# Patient Record
Sex: MALE | Race: WHITE | NOT HISPANIC OR LATINO | Employment: FULL TIME | URBAN - METROPOLITAN AREA
[De-identification: names, ages, dates, MRNs, and addresses within clinical notes are randomized per-mention and may not be internally consistent; named-entity substitution may affect disease eponyms.]

---

## 2017-05-18 ENCOUNTER — OFFICE VISIT (OUTPATIENT)
Dept: OPHTHALMOLOGY | Facility: CLINIC | Age: 42
End: 2017-05-18
Attending: OPHTHALMOLOGY
Payer: COMMERCIAL

## 2017-05-18 DIAGNOSIS — H35.9 MYELINATED RETINAL NERVE FIBER LAYER: ICD-10-CM

## 2017-05-18 DIAGNOSIS — H30.042 ACUTE MACULAR NEURORETINITIS OF LEFT EYE: Primary | ICD-10-CM

## 2017-05-18 DIAGNOSIS — H52.13 MYOPIA WITH ASTIGMATISM, BILATERAL: ICD-10-CM

## 2017-05-18 DIAGNOSIS — H52.203 MYOPIA WITH ASTIGMATISM, BILATERAL: ICD-10-CM

## 2017-05-18 PROCEDURE — 92250 FUNDUS PHOTOGRAPHY W/I&R: CPT | Mod: ZF | Performed by: OPHTHALMOLOGY

## 2017-05-18 PROCEDURE — 99212 OFFICE O/P EST SF 10 MIN: CPT | Mod: 25,ZF

## 2017-05-18 PROCEDURE — 92015 DETERMINE REFRACTIVE STATE: CPT | Mod: ZF

## 2017-05-18 ASSESSMENT — REFRACTION_WEARINGRX
OD_AXIS: 032
OS_SPHERE: -1.25
OD_CYLINDER: +0.75
OS_CYLINDER: SPHERE
OD_SPHERE: -1.75

## 2017-05-18 ASSESSMENT — REFRACTION_MANIFEST
OS_AXIS: 015
OS_SPHERE: -1.75
OS_CYLINDER: +0.25
OD_AXIS: 032
OD_CYLINDER: +0.75
OD_SPHERE: -1.75

## 2017-05-18 ASSESSMENT — CUP TO DISC RATIO
OS_RATIO: 0.3
OD_RATIO: 0.3

## 2017-05-18 ASSESSMENT — CONF VISUAL FIELD
OS_NORMAL: 1
OD_NORMAL: 1

## 2017-05-18 ASSESSMENT — EXTERNAL EXAM - LEFT EYE: OS_EXAM: NORMAL

## 2017-05-18 ASSESSMENT — VISUAL ACUITY
METHOD: SNELLEN - LINEAR
OS_CC: 20/20
OD_CC: 20/20
OS_CC+: -1
CORRECTION_TYPE: GLASSES

## 2017-05-18 ASSESSMENT — SLIT LAMP EXAM - LIDS
COMMENTS: NORMAL
COMMENTS: NORMAL

## 2017-05-18 ASSESSMENT — TONOMETRY
OS_IOP_MMHG: 15
OD_IOP_MMHG: 17
IOP_METHOD: TONOPEN

## 2017-05-18 ASSESSMENT — EXTERNAL EXAM - RIGHT EYE: OD_EXAM: NORMAL

## 2017-05-18 NOTE — PROGRESS NOTES
I have confirmed the patient's and reviewed Past Medical History, Past Surgical History, Social History, Family History, Problem List, Medication List and agree with Tech note.    CC: follow up for [facility]A    HPI:  - vision seems the same or improved since last visit  - sees an optometrist every year    SUMMARY: Mr. Eid is a 41 year old male who was referred by Dr. Harmon for further evaluation for concerns in the patient's OCT for an abnormality inferotemporally. 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     IMAGING/TESTING:  - Optos fundus photos obtained last visit , shows myelinated retinal nerve fiber layer superior temporal in left eye   - 55degree FAF obtained, shows normal findings macula both eyes and disappearance of dark spot inferiorly in left eye, stable   - Octopus 24-2 visual field left eye showed before peripheral superior-nasal defect consistent which is now less dense and really changed into several non-specific defects    A/P:    Assessment & Plan     1. Unilateral Acute Idiopathic Maculopathy, left eye:   - appears stable and consistent with previous exam   - 55 degree fundus autofluorescence unchanged OU (previous inferotemporal lesion OS completely resolved)   - continue to monitor    2. Myelinated retina nerve fiber layer, left eye:   - Optos fundus photos both eyes for documentation previously obtained shows that this is getting smaller.    3. Myopic astigmatism, both eyes: stable     F/u: Return to Retina clinic in 1 yr for dilated eye exam and fundus autofluorescence ou.    Ant Lemos MD MPH   Ophthalmology Resident PGY-3    ATTESTATION:  I have seen and examined the patient with Dr. Lemos and agree with the findings in this note, as well as the interpretations of the diagnostic tests.    Alycia Ramírez MD PhD.  Professor & Chair

## 2017-05-18 NOTE — MR AVS SNAPSHOT
After Visit Summary   5/18/2017    Jose Eid    MRN: [id number]           Patient Information     Date Of Birth          1975        Visit Information        Provider Department      5/18/2017 7:30 AM [facility]cia [facility]        Today's Diagnoses     Acute macular neuroretinitis of left eye    -  1    Myopia with astigmatism, bilateral        Myelinated retinal nerve fiber layer - Left Eye           Follow-ups after your visit        Follow-up notes from your care team     Return in about 1 year (around 5/18/2018) for UAIM OS FAF OU.      Future tests that were ordered for you today     Open Future Orders        Priority Expected Expires Ordered    Fundus Autofluorescence Image (FAF) OU (both eyes) Routine  11/19/2018 5/18/2017            Who to contact     Please call your clinic at [phone] to:    Ask questions about your health    Make or cancel appointments    Discuss your medicines    Learn about your test results    Speak to your doctor   If you have compliments or concerns about an experience at your clinic, or if you wish to file a complaint, please contact [facility] Patient Relations at [phone] or email us at [email or web address]         Additional Information About Your Visit        MyChart Information     [employer] gives you secure access to your electronic health record. If you see a primary care provider, you can also send messages to your care team and make appointments. If you have questions, please call your primary care clinic.  If you do not have a primary care provider, please call [phone] and they will assist you.      [employer] is an electronic gateway that provides easy, online access to your medical records. With [employer], you can request a clinic appointment, read your test results, renew a prescription or communicate with your care team.     To access your existing account, please contact your  [facility] or call [phone] for assistance.        Care EveryWhere ID     This is your Care EveryWhere ID. This could be used by other organizations to access your [facility] medical records  [id number]         Blood Pressure from Last 3 Encounters:   08/11/14 112/76   08/01/14 120/78   01/20/14 130/83    Weight from Last 3 Encounters:   08/11/14 102.5 kg (226 lb)   08/01/14 102.5 kg (226 lb)   01/20/14 117.7 kg (259 lb 8 oz)              We Performed the Following     Fundus Autofluorescence Image (FAF) OU (both eyes)        Primary Care Provider Office Phone # Fax #    Severino Lyndon Fernandes [phone]       [facility] [address] [place] [address]        Thank you!     Thank you for choosing [facility]  for your care. Our goal is always to provide you with excellent care. Hearing back from our patients is one way we can continue to improve our services. Please take a few minutes to complete the written survey that you may receive in the mail after your visit with us. Thank you!             Your Updated Medication List - Protect others around you: Learn how to safely use, store and throw away your medicines at www.disposemymeds.org.          This list is accurate as of: 5/18/17  8:35 AM.  Always use your most recent med list.                   Brand Name Dispense Instructions for use    albuterol 108 (90 BASE) MCG/ACT Inhaler    albuterol    2 Inhaler    Inhale 1-2 puffs into the lungs every 4 hours as needed for shortness of breath / dyspnea

## 2018-04-18 ENCOUNTER — OFFICE VISIT (OUTPATIENT)
Dept: OPHTHALMOLOGY | Facility: CLINIC | Age: 43
End: 2018-04-18
Attending: OPHTHALMOLOGY
Payer: COMMERCIAL

## 2018-04-18 DIAGNOSIS — H52.13 MYOPIA WITH ASTIGMATISM, BILATERAL: Primary | ICD-10-CM

## 2018-04-18 DIAGNOSIS — H30.042 ACUTE MACULAR NEURORETINITIS OF LEFT EYE: ICD-10-CM

## 2018-04-18 DIAGNOSIS — H52.203 MYOPIA WITH ASTIGMATISM, BILATERAL: Primary | ICD-10-CM

## 2018-04-18 DIAGNOSIS — H35.9 MYELINATED RETINAL NERVE FIBER LAYER: ICD-10-CM

## 2018-04-18 PROCEDURE — G0463 HOSPITAL OUTPT CLINIC VISIT: HCPCS | Mod: ZF

## 2018-04-18 ASSESSMENT — REFRACTION_WEARINGRX
OS_AXIS: 015
OD_CYLINDER: +0.75
OD_SPHERE: -1.75
OS_SPHERE: -1.75
OS_CYLINDER: +0.25
OD_AXIS: 032

## 2018-04-18 ASSESSMENT — CUP TO DISC RATIO
OD_RATIO: 0.3
OS_RATIO: 0.3

## 2018-04-18 ASSESSMENT — SLIT LAMP EXAM - LIDS
COMMENTS: NORMAL
COMMENTS: NORMAL

## 2018-04-18 ASSESSMENT — VISUAL ACUITY
OD_CC: 20/20
CORRECTION_TYPE: GLASSES
METHOD: SNELLEN - LINEAR
OS_CC: 20/20

## 2018-04-18 ASSESSMENT — EXTERNAL EXAM - LEFT EYE: OS_EXAM: NORMAL

## 2018-04-18 ASSESSMENT — EXTERNAL EXAM - RIGHT EYE: OD_EXAM: NORMAL

## 2018-04-18 ASSESSMENT — TONOMETRY
OD_IOP_MMHG: 18
OS_IOP_MMHG: 16
IOP_METHOD: TONOPEN

## 2018-04-18 ASSESSMENT — CONF VISUAL FIELD
OD_NORMAL: 1
OS_NORMAL: 1

## 2018-04-18 NOTE — PROGRESS NOTES
I have confirmed the patient's and reviewed Past Medical History, Past Surgical History, Social History, Family History, Problem List, Medication List and agree with Tech note.    CC: follow up for [facility]    HPI:  - vision seems the same or improved since last visit per patient   - sees an optometrist every year    SUMMARY: Mr. Eid is a 42 year old male who was referred by Dr. Harmon for further evaluation for concerns in the patient's OCT for an abnormality inferotemporally. 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     IMAGING/TESTING:  - Optos fundus photos obtained last visit , shows myelinated retinal nerve fiber layer superior temporal in left eye   - 55degree FAF obtained, shows normal findings macula both eyes and disappearance of dark spot inferiorly in left eye, stable   - Octopus 24-2 visual field left eye showed before peripheral superior-nasal defect consistent which is now less dense and really changed into several non-specific defects    A/P:    Assessment & Plan     1. Unilateral Acute Idiopathic Maculopathy, left eye:   - appears stable and consistent with previous exam   - 55 degree fundus autofluorescence unchanged OU (previous inferotemporal lesion OS completely resolved)   - continue to monitor    2. Myelinated retina nerve fiber layer, left eye:   - Optos fundus photos both eyes for documentation previously obtained shows that this is getting smaller and we see that again today    3. Myopic astigmatism, both eyes: stable     F/u: Return to Retina clinic in 1 yr for dilated eye exam and fundus autofluorescence both eyes 55 degrees      Alycia Ramírez MD PhD.  Professor & Chair

## 2018-04-18 NOTE — MR AVS SNAPSHOT
After Visit Summary   4/18/2018    Jose Eid    MRN: [id number]           Patient Information     Date Of Birth          1975        Visit Information        Provider Department      4/18/2018 7:30 AM Alycia [facility]        Today's Diagnoses     Myopia with astigmatism, bilateral    -  1    Acute macular neuroretinitis of left eye        Myelinated retinal nerve fiber layer - Left Eye           Follow-ups after your visit        Follow-up notes from your care team     Return in about 1 year (around 4/18/2019) for UAIM , FAF OU.      Future tests that were ordered for you today     Open Future Orders        Priority Expected Expires Ordered    Fundus Autofluorescence Image (FAF) OU (both eyes) Routine  10/20/2019 4/18/2018            Who to contact     Please call your clinic at [phone] to:    Ask questions about your health    Make or cancel appointments    Discuss your medicines    Learn about your test results    Speak to your doctor            Additional Information About Your Visit        [email or web address] Information     [employer] gives you secure access to your electronic health record. If you see a primary care provider, you can also send messages to your care team and make appointments. If you have questions, please call your primary care clinic.  If you do not have a primary care provider, please call [phone] and they will assist you.      [employer] is an electronic gateway that provides easy, online access to your medical records. With [employer], you can request a clinic appointment, read your test results, renew a prescription or communicate with your care team.     To access your existing account, please contact your [facility] or call [phone] for assistance.        Care EveryWhere ID     This is your Care EveryWhere ID. This could be used by other organizations to access your [facility] medical records  [id number]          Blood Pressure from Last 3 Encounters:   08/11/14 112/76   08/01/14 120/78   01/20/14 130/83    Weight from Last 3 Encounters:   08/11/14 102.5 kg (226 lb)   08/01/14 102.5 kg (226 lb)   01/20/14 117.7 kg (259 lb 8 oz)              We Performed the Following     Fundus Autofluorescence Image (FAF) OU (both eyes)        Primary Care Provider Office Phone # Fax #    Severino Lyndon Fernandes [phone]       [facility] [address] [place] [address]        Equal Access to Services     [facility] LYDIA : Hadii aad ku hadasho Soobeyali, waaxda luqadaha, qaybta kaalmada adeanthonyyada, sandra brown . [facility] [phone].    ATENCIÓN: Si habla español, tiene a hernandez disposición servicios gratuitos de asistencia lingüística. [facility] [phone].    We comply with applicable federal civil rights laws and [place] laws. We do not discriminate on the basis of race, color, national origin, age, disability, sex, sexual orientation, or gender identity.            Thank you!     Thank you for choosing [facility]  for your care. Our goal is always to provide you with excellent care. Hearing back from our patients is one way we can continue to improve our services. Please take a few minutes to complete the written survey that you may receive in the mail after your visit with us. Thank you!             Your Updated Medication List - Protect others around you: Learn how to safely use, store and throw away your medicines at www.disposemymeds.org.          This list is accurate as of 4/18/18  8:21 AM.  Always use your most recent med list.                   Brand Name Dispense Instructions for use Diagnosis    albuterol 108 (90 Base) MCG/ACT Inhaler    PROAIR HFA    2 Inhaler    Inhale 1-2 puffs into the lungs every 4 hours as needed for shortness of breath / dyspnea    Intermittent asthma       metFORMIN 500 MG tablet    GLUCOPHAGE     500 mg 2 times daily (with meals)

## 2018-04-18 NOTE — NURSING NOTE
Chief Complaints and History of Present Illnesses   Patient presents with     Annual Eye Exam     Unilateral Acute Idiopathic Maculopathy, left eye:     HPI    Affected eye(s):  Both   Symptoms:     No blurred vision   No decreased vision   No floaters   No flashes         Do you have eye pain now?:  No      Comments:  Annual follow up for Unilateral Acute Idiopathic Maculopathy, left eye.  The patient has started Metformin recently.  RAMU Gamble 7:36 AM 04/18/2018

## 2018-11-04 NOTE — NURSING NOTE
Chief Complaints and History of Present Illnesses   Patient presents with     Follow Up For     Unilateral Acute Idiopathic Maculopathy, left eye:     HPI    Affected eye(s):  Both   Symptoms:     No blurred vision   No decreased vision   No floaters   No flashes         Do you have eye pain now?:  No      Comments:  Six month follow up for Unilateral Acute Idiopathic Maculopathy, left eye.  RAMU Gamble 7:10 AM 05/18/2017              
right/anterior nostril

## 2019-04-24 ENCOUNTER — OFFICE VISIT (OUTPATIENT)
Dept: OPHTHALMOLOGY | Facility: CLINIC | Age: 44
End: 2019-04-24
Attending: OPHTHALMOLOGY
Payer: COMMERCIAL

## 2019-04-24 DIAGNOSIS — H30.042 ACUTE MACULAR NEURORETINITIS OF LEFT EYE: ICD-10-CM

## 2019-04-24 PROCEDURE — 92250 FUNDUS PHOTOGRAPHY W/I&R: CPT | Mod: ZF | Performed by: OPHTHALMOLOGY

## 2019-04-24 PROCEDURE — G0463 HOSPITAL OUTPT CLINIC VISIT: HCPCS | Mod: ZF

## 2019-04-24 PROCEDURE — 92015 DETERMINE REFRACTIVE STATE: CPT | Mod: ZF

## 2019-04-24 RX ORDER — CODEINE PHOSPHATE AND GUAIFENESIN 10; 100 MG/5ML; MG/5ML
5 SOLUTION ORAL
COMMUNITY
Start: 2017-07-03 | End: 2020-08-27

## 2019-04-24 RX ORDER — PRAVASTATIN SODIUM 10 MG
TABLET ORAL
Refills: 3 | COMMUNITY
Start: 2019-03-14 | End: 2022-09-01

## 2019-04-24 RX ORDER — ALBUTEROL SULFATE 90 UG/1
2 AEROSOL, METERED RESPIRATORY (INHALATION)
COMMUNITY
Start: 2017-07-03 | End: 2020-08-27

## 2019-04-24 ASSESSMENT — REFRACTION_WEARINGRX
OD_AXIS: 032
OD_CYLINDER: +0.75
OS_SPHERE: -1.75
OS_CYLINDER: +0.25
OD_SPHERE: -1.75
OS_AXIS: 015

## 2019-04-24 ASSESSMENT — CUP TO DISC RATIO
OD_RATIO: 0.3
OS_RATIO: 0.3

## 2019-04-24 ASSESSMENT — VISUAL ACUITY
METHOD: SNELLEN - LINEAR
OD_CC: 20/15
OS_CC: 20/15
CORRECTION_TYPE: GLASSES

## 2019-04-24 ASSESSMENT — TONOMETRY
OD_IOP_MMHG: 13
OS_IOP_MMHG: 15
IOP_METHOD: TONOPEN

## 2019-04-24 ASSESSMENT — CONF VISUAL FIELD
OD_NORMAL: 1
OS_NORMAL: 1

## 2019-04-24 ASSESSMENT — REFRACTION_MANIFEST
OS_CYLINDER: +0.50
OD_CYLINDER: +0.75
OD_AXIS: 030
OD_SPHERE: -1.75
OS_SPHERE: -1.75
OS_AXIS: 006

## 2019-04-24 ASSESSMENT — EXTERNAL EXAM - RIGHT EYE: OD_EXAM: NORMAL

## 2019-04-24 ASSESSMENT — EXTERNAL EXAM - LEFT EYE: OS_EXAM: NORMAL

## 2019-04-24 ASSESSMENT — SLIT LAMP EXAM - LIDS
COMMENTS: NORMAL
COMMENTS: NORMAL

## 2019-04-24 NOTE — PROGRESS NOTES
I have confirmed the patient's and reviewed Past Medical History, Past Surgical History, Social History, Family History, Problem List, Medication List and agree with Tech note.    CC: follow up for [facility]    HPI:  - vision seems the same or improved since last visit per patient   - sees an optometrist every year    SUMMARY: Mr. Eid is a 42 year old male who was referred by Dr. Harmon for further evaluation for concerns in the patient's OCT for an abnormality inferotemporally. 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     IMAGING/TESTING:  - Optos fundus photos obtained last visit , shows myelinated retinal nerve fiber layer superior temporal in left eye   - 55degree FAF obtained, shows normal findings macula both eyes and disappearance of dark spot inferiorly in left eye, stable   - Octopus 24-2 visual field left eye showed before peripheral superior-nasal defect consistent which is now less dense and really changed into several non-specific defects    A/P:    Assessment & Plan     1. Unilateral Acute Idiopathic Maculopathy, left eye:   - appears stable and consistent with previous exam   - 55 degree fundus autofluorescence unchanged OU (previous inferotemporal lesion OS completely resolved)   - continue to monitor    2. Myelinated retina nerve fiber layer, left eye:   - Optos fundus photos both eyes for documentation previously obtained shows that this is getting smaller and we see that again today    3. Myopic astigmatism, both eyes: stable     F/u: Return to Retina clinic in 1 yr for dilated eye exam and fundus autofluorescence both eyes 55 degrees      Marcellus De León MD, PhD  Vitreoretinal Surgery Fellow    Attestation:  I have seen and examined the patient with Dr. De León and agree with the findings in this note, as well as the interpretations of the diagnostic tests.      Alycia Ramírez MD PhD.  Professor & Chair

## 2019-04-24 NOTE — NURSING NOTE
Chief Complaints and History of Present Illnesses   Patient presents with     Follow Up     Chief Complaint(s) and History of Present Illness(es)     Follow Up     Laterality: left eye    Onset: 1 year ago              Comments      Unilateral Acute Idiopathic Maculopathy, left eye  Pt. States that he is doing well.  No change in VA BE.  No pain BE.  Lillian Whiting COT 7:13 AM April 24, 2019

## 2019-11-06 ENCOUNTER — HEALTH MAINTENANCE LETTER (OUTPATIENT)
Age: 44
End: 2019-11-06

## 2020-08-27 ENCOUNTER — OFFICE VISIT (OUTPATIENT)
Dept: OPHTHALMOLOGY | Facility: CLINIC | Age: 45
End: 2020-08-27
Attending: OPHTHALMOLOGY
Payer: COMMERCIAL

## 2020-08-27 DIAGNOSIS — H30.042 ACUTE MACULAR NEURORETINITIS OF LEFT EYE: ICD-10-CM

## 2020-08-27 PROCEDURE — G0463 HOSPITAL OUTPT CLINIC VISIT: HCPCS | Mod: ZF

## 2020-08-27 PROCEDURE — 92250 FUNDUS PHOTOGRAPHY W/I&R: CPT | Mod: ZF | Performed by: OPHTHALMOLOGY

## 2020-08-27 RX ORDER — CHOLECALCIFEROL (VITAMIN D3) 1250 MCG
1250 CAPSULE ORAL
COMMUNITY

## 2020-08-27 ASSESSMENT — CONF VISUAL FIELD
OS_NORMAL: 1
METHOD: COUNTING FINGERS
OD_NORMAL: 1

## 2020-08-27 ASSESSMENT — REFRACTION_WEARINGRX
OD_SPHERE: -1.75
OS_AXIS: 015
OS_SPHERE: -1.75
OD_AXIS: 032
OD_CYLINDER: +0.75
OS_CYLINDER: +0.25

## 2020-08-27 ASSESSMENT — VISUAL ACUITY
OD_CC: 20/15
METHOD: SNELLEN - LINEAR
OS_CC: 20/15

## 2020-08-27 ASSESSMENT — TONOMETRY
OD_IOP_MMHG: 21
OS_IOP_MMHG: 21
IOP_METHOD: TONOPEN

## 2020-08-27 ASSESSMENT — EXTERNAL EXAM - RIGHT EYE: OD_EXAM: NORMAL

## 2020-08-27 ASSESSMENT — CUP TO DISC RATIO
OD_RATIO: 0.3
OS_RATIO: 0.3

## 2020-08-27 ASSESSMENT — EXTERNAL EXAM - LEFT EYE: OS_EXAM: NORMAL

## 2020-08-27 ASSESSMENT — SLIT LAMP EXAM - LIDS
COMMENTS: NORMAL
COMMENTS: NORMAL

## 2020-08-27 NOTE — PROGRESS NOTES
I have confirmed the patient's and reviewed Past Medical History, Past Surgical History, Social History, Family History, Problem List, Medication List and agree with Tech note.    CC: follow up for [facility]    HPI:  - vision seems the same or improved since last visit per patient   - sees an optometrist every year    SUMMARY: Mr. Eid is a 44 year old male who was referred by Dr. Harmon for further evaluation for concerns in the patient's OCT for an abnormality inferotemporally. 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     IMAGING/TESTING:  - Optos fundus photos obtained last visit , shows myelinated retinal nerve fiber layer superior temporal in left eye   - 55degree FAF obtained, shows normal findings macula both eyes and disappearance of dark spot inferiorly in left eye, stable   - Octopus 24-2 visual field left eye showed before peripheral superior-nasal defect consistent which is now less dense and really changed into several non-specific defects    A/P:    Assessment & Plan     1. Unilateral Acute Idiopathic Maculopathy, left eye:   - appears stable and consistent with previous exam   - 55 degree fundus autofluorescence unchanged OU (previous inferotemporal lesion OS completely resolved)   - continue to monitor    2. Myelinated retina nerve fiber layer, left eye:   - Optos fundus photos both eyes for documentation previously obtained shows that this is getting smaller and we see that again today    3. Myopic astigmatism, both eyes: stable     F/u: Return to Retina clinic in 1 yr for dilated eye exam and fundus autofluorescence both eyes 55 degrees        Alycia Ramírez MD PhD.  Professor & Chair

## 2020-08-27 NOTE — NURSING NOTE
Chief Complaints and History of Present Illnesses   Patient presents with     Follow Up     Chief Complaint(s) and History of Present Illness(es)     Follow Up     Laterality: left eye    Associated symptoms: Negative for eye pain, floaters, flashes, dryness and itching    Pain scale: 0/10              Comments     Jose is here to continue care for Acute macular neuroretinitis of left eye - Left Eye.  He states vision stable    Hua Welsh COT 7:35 AM August 27, 2020

## 2020-11-29 ENCOUNTER — HEALTH MAINTENANCE LETTER (OUTPATIENT)
Age: 45
End: 2020-11-29

## 2021-08-20 DIAGNOSIS — H30.042 ACUTE MACULAR NEURORETINITIS OF LEFT EYE: Primary | ICD-10-CM

## 2021-08-30 ENCOUNTER — OFFICE VISIT (OUTPATIENT)
Dept: OPHTHALMOLOGY | Facility: CLINIC | Age: 46
End: 2021-08-30
Attending: OPHTHALMOLOGY
Payer: COMMERCIAL

## 2021-08-30 DIAGNOSIS — H30.042 ACUTE MACULAR NEURORETINITIS OF LEFT EYE: ICD-10-CM

## 2021-08-30 PROCEDURE — 92015 DETERMINE REFRACTIVE STATE: CPT

## 2021-08-30 PROCEDURE — 99213 OFFICE O/P EST LOW 20 MIN: CPT | Performed by: OPHTHALMOLOGY

## 2021-08-30 PROCEDURE — G0463 HOSPITAL OUTPT CLINIC VISIT: HCPCS

## 2021-08-30 PROCEDURE — 92250 FUNDUS PHOTOGRAPHY W/I&R: CPT | Performed by: OPHTHALMOLOGY

## 2021-08-30 PROCEDURE — 99207 FUNDUS AUTOFLUORESCENCE IMAGE (FAF) OU (BOTH EYES): CPT | Performed by: OPHTHALMOLOGY

## 2021-08-30 ASSESSMENT — VISUAL ACUITY
OD_CC+: -2
OS_CC: 20/20
METHOD: SNELLEN - LINEAR
OD_CC: 20/20
CORRECTION_TYPE: GLASSES

## 2021-08-30 ASSESSMENT — REFRACTION_WEARINGRX
OD_SPHERE: -1.75
OS_AXIS: 015
OD_CYLINDER: +0.75
OD_AXIS: 032
OS_CYLINDER: +0.25
OS_SPHERE: -1.75

## 2021-08-30 ASSESSMENT — REFRACTION_MANIFEST
OD_AXIS: 032
OD_SPHERE: -1.50
OS_AXIS: 180
OS_SPHERE: -1.25
OS_CYLINDER: +0.50
OD_CYLINDER: +0.75

## 2021-08-30 ASSESSMENT — CUP TO DISC RATIO
OD_RATIO: 0.3
OS_RATIO: 0.3

## 2021-08-30 ASSESSMENT — CONF VISUAL FIELD
OD_NORMAL: 1
OS_NORMAL: 1
METHOD: COUNTING FINGERS

## 2021-08-30 ASSESSMENT — TONOMETRY
OD_IOP_MMHG: 19
OS_IOP_MMHG: 18
IOP_METHOD: TONOPEN

## 2021-08-30 ASSESSMENT — EXTERNAL EXAM - RIGHT EYE: OD_EXAM: NORMAL

## 2021-08-30 ASSESSMENT — EXTERNAL EXAM - LEFT EYE: OS_EXAM: NORMAL

## 2021-08-30 ASSESSMENT — SLIT LAMP EXAM - LIDS
COMMENTS: NORMAL
COMMENTS: NORMAL

## 2021-08-30 NOTE — PROGRESS NOTES
I have confirmed the patient's and reviewed Past Medical History, Past Surgical History, Social History, Family History, Problem List, Medication List and agree with Tech note.    CC: follow up for [facility]    HPI:  - vision seems the same or improved since last visit per patient   - sees an optometrist every year    SUMMARY: Mr. Eid is a 45 year old male who was referred by Dr. Harmon for further evaluation for concerns in the patient's OCT for an abnormality inferotemporally. 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     IMAGING/TESTING:  - Optos fundus photos obtained last visit , shows myelinated retinal nerve fiber layer superior temporal in left eye   - 55degree FAF obtained, shows normal findings macula both eyes and disappearance of dark spot inferiorly in left eye, stable   - Octopus 24-2 visual field left eye showed before peripheral superior-nasal defect consistent which is now less dense and really changed into several non-specific defects    A/P:    Assessment & Plan     1. Unilateral Acute Idiopathic Maculopathy, left eye:   - appears stable and consistent with previous exam   - 55 degree fundus autofluorescence unchanged OU (previous inferotemporal lesion OS completely resolved)   - continue to monitor    2. Myelinated retina nerve fiber layer, left eye:   - Optos fundus photos both eyes for documentation previously obtained shows that this is getting smaller and we see that again today    3. Myopic astigmatism, both eyes: stable     F/u: Return to Retina clinic in 1 yr for dilated eye exam and fundus autofluorescence both eyes 55 degrees        Alycia Ramírez MD PhD.  Professor & Chair

## 2021-08-30 NOTE — NURSING NOTE
Chief Complaints and History of Present Illnesses   Patient presents with     Follow Up     Acute macular neuroretinitis of left eye     Chief Complaint(s) and History of Present Illness(es)     Follow Up     Laterality: left eye    Course: stable    Associated symptoms: Negative for eye pain, redness, dryness, itching, floaters and flashes    Treatments tried: no treatments    Pain scale: 0/10    Comments: Acute macular neuroretinitis of left eye              Comments     He states that his vision has seemed stable in both eyes, since his last eye exam.  Patient denies having any eye discomfort.    Kandi Hudson, COT 3:37 PM  August 30, 2021

## 2021-09-19 ENCOUNTER — HEALTH MAINTENANCE LETTER (OUTPATIENT)
Age: 46
End: 2021-09-19

## 2021-11-01 ENCOUNTER — TELEPHONE (OUTPATIENT)
Dept: OPHTHALMOLOGY | Facility: CLINIC | Age: 46
End: 2021-11-01

## 2021-11-01 NOTE — TELEPHONE ENCOUNTER
[facility] Call Center    Phone Message    May a detailed message be left on voicemail: no     Reason for Call: Other: Pt requesting a paper copy of his eye glasses script be mailed to his home address that is on file     Action Taken: Message routed to:  [facility] eye

## 2022-01-09 ENCOUNTER — HEALTH MAINTENANCE LETTER (OUTPATIENT)
Age: 47
End: 2022-01-09

## 2022-08-18 DIAGNOSIS — H30.042 ACUTE MACULAR NEURORETINITIS OF LEFT EYE: Primary | ICD-10-CM

## 2022-09-01 ENCOUNTER — OFFICE VISIT (OUTPATIENT)
Dept: OPHTHALMOLOGY | Facility: CLINIC | Age: 47
End: 2022-09-01
Attending: OPHTHALMOLOGY
Payer: COMMERCIAL

## 2022-09-01 DIAGNOSIS — H30.042 ACUTE MACULAR NEURORETINITIS OF LEFT EYE: Primary | ICD-10-CM

## 2022-09-01 PROCEDURE — G0463 HOSPITAL OUTPT CLINIC VISIT: HCPCS | Mod: 25

## 2022-09-01 PROCEDURE — 92134 CPTRZ OPH DX IMG PST SGM RTA: CPT | Mod: 26 | Performed by: STUDENT IN AN ORGANIZED HEALTH CARE EDUCATION/TRAINING PROGRAM

## 2022-09-01 PROCEDURE — 99214 OFFICE O/P EST MOD 30 MIN: CPT | Mod: GC | Performed by: STUDENT IN AN ORGANIZED HEALTH CARE EDUCATION/TRAINING PROGRAM

## 2022-09-01 PROCEDURE — 92250 FUNDUS PHOTOGRAPHY W/I&R: CPT | Performed by: OPHTHALMOLOGY

## 2022-09-01 PROCEDURE — 92134 CPTRZ OPH DX IMG PST SGM RTA: CPT | Performed by: OPHTHALMOLOGY

## 2022-09-01 PROCEDURE — 99207 FUNDUS AUTOFLUORESCENCE IMAGE (FAF) OU (BOTH EYES): CPT | Mod: 26 | Performed by: STUDENT IN AN ORGANIZED HEALTH CARE EDUCATION/TRAINING PROGRAM

## 2022-09-01 RX ORDER — ATORVASTATIN CALCIUM 20 MG/1
TABLET, FILM COATED ORAL
COMMUNITY
Start: 2022-06-28

## 2022-09-01 ASSESSMENT — CUP TO DISC RATIO
OS_RATIO: 0.3
OD_RATIO: 0.3

## 2022-09-01 ASSESSMENT — REFRACTION_MANIFEST
OS_CYLINDER: +0.50
OD_CYLINDER: +0.50
OS_AXIS: 005
OD_SPHERE: -1.00
OD_AXIS: 035
OS_SPHERE: -1.75

## 2022-09-01 ASSESSMENT — VISUAL ACUITY
METHOD: SNELLEN - LINEAR
OS_CC+: -1
CORRECTION_TYPE: GLASSES
OS_CC: 20/15
OD_CC: 20/20

## 2022-09-01 ASSESSMENT — REFRACTION_WEARINGRX
OD_AXIS: 032
OS_SPHERE: -1.75
OD_SPHERE: -1.75
OS_CYLINDER: +0.25
OS_AXIS: 015
OD_CYLINDER: +0.75

## 2022-09-01 ASSESSMENT — TONOMETRY
IOP_METHOD: TONOPEN
OS_IOP_MMHG: 17
OD_IOP_MMHG: 17

## 2022-09-01 ASSESSMENT — SLIT LAMP EXAM - LIDS
COMMENTS: NORMAL
COMMENTS: NORMAL

## 2022-09-01 ASSESSMENT — EXTERNAL EXAM - RIGHT EYE: OD_EXAM: NORMAL

## 2022-09-01 ASSESSMENT — EXTERNAL EXAM - LEFT EYE: OS_EXAM: NORMAL

## 2022-09-01 ASSESSMENT — CONF VISUAL FIELD
OD_NORMAL: 1
METHOD: COUNTING FINGERS
OS_NORMAL: 1

## 2022-09-01 NOTE — PROGRESS NOTES
I have confirmed the patient's and reviewed Past Medical History, Past Surgical History, Social History, Family History, Problem List, Medication List and agree with Tech note.    CC: follow up for [facility]    HPI:  - vision seems stable with no new ocular symptoms.  - sees an optometrist every year    SUMMARY: Mr. Eid is a 45 year old male who was referred by Dr. Harmon for further evaluation for concerns in the patient's OCT for an abnormality inferotemporally. 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     IMAGING/TESTING:    -OCT shows preserved contour OD with no fluid and is stable. OS is stable as well with some drusen and preserved contour with no fluid  - Optos fundus photos obtained last visit , shows myelinated retinal nerve fiber layer superior temporal in left eye   - 55degree FAF obtained, shows normal findings macula both eyes and disappearance of dark spot inferiorly in left eye, and continue to be stable   - Octopus 24-2 visual field left eye showed before peripheral superior-nasal defect consistent which is now less dense and really changed into several non-specific defects    A/P:    Assessment & Plan     1. Unilateral Acute Idiopathic Maculopathy, left eye:   - appears stable and consistent with previous exam   - 55 degree fundus autofluorescence unchanged OU (previous inferotemporal lesion OS completely resolved)   - continue to monitor    2. Myelinated retina nerve fiber layer, left eye:   - Compared to Optos fundus photos from last year, seems stable to slightly smaller in appearance on exam    3. Myopic astigmatism, both eyes: stable     F/u: Return to Retina clinic in 1 yr for dilated eye exam and fundus autofluorescence both eyes 55 degrees    David Ortiz MD MPH  Vitreoretinal Fellow PGY-5  [facility]     Attestation:  I have seen and examined the patient with Dr. David Ortiz MD and agree with  the findings in this note, as well as the interpretations of the diagnostic tests.      Alycia Ramírez MD PhD.  Professor & Chair      Alycia Ramírez MD PhD.  Professor & Chair

## 2022-09-01 NOTE — NURSING NOTE
Chief Complaints and History of Present Illnesses   Patient presents with     Follow Up     1 year follow up Unilateral Acute Idiopathic Maculopathy, left eye     Chief Complaint(s) and History of Present Illness(es)     Follow Up     Comments: 1 year follow up Unilateral Acute Idiopathic Maculopathy, left eye              Comments     Pt states vision is about the same as last visit. No eye pain today. No new flashes or floaters.  No redness or dryness.  Pt is prediabetic, does not check blood sugars.  A1C: unknown to pt.  No results found for: A1C    PATRICK Olson September 1, 2022 7:14 AM

## 2022-11-21 ENCOUNTER — HEALTH MAINTENANCE LETTER (OUTPATIENT)
Age: 47
End: 2022-11-21

## 2023-02-02 ENCOUNTER — APPOINTMENT (OUTPATIENT)
Dept: URBAN - METROPOLITAN AREA CLINIC 260 | Age: 48
Setting detail: DERMATOLOGY
End: 2023-02-16

## 2023-02-02 VITALS — WEIGHT: 263 LBS | HEIGHT: 73 IN

## 2023-02-02 DIAGNOSIS — L81.4 OTHER MELANIN HYPERPIGMENTATION: ICD-10-CM

## 2023-02-02 DIAGNOSIS — D18.0 HEMANGIOMA: ICD-10-CM

## 2023-02-02 DIAGNOSIS — D485 NEOPLASM OF UNCERTAIN BEHAVIOR OF SKIN: ICD-10-CM

## 2023-02-02 DIAGNOSIS — L73.8 OTHER SPECIFIED FOLLICULAR DISORDERS: ICD-10-CM

## 2023-02-02 DIAGNOSIS — Z71.89 OTHER SPECIFIED COUNSELING: ICD-10-CM

## 2023-02-02 DIAGNOSIS — D22 MELANOCYTIC NEVI: ICD-10-CM

## 2023-02-02 DIAGNOSIS — L20.89 OTHER ATOPIC DERMATITIS: ICD-10-CM

## 2023-02-02 DIAGNOSIS — L82.1 OTHER SEBORRHEIC KERATOSIS: ICD-10-CM

## 2023-02-02 DIAGNOSIS — L84 CORNS AND CALLOSITIES: ICD-10-CM

## 2023-02-02 PROBLEM — L20.84 INTRINSIC (ALLERGIC) ECZEMA: Status: ACTIVE | Noted: 2023-02-02

## 2023-02-02 PROBLEM — D18.01 HEMANGIOMA OF SKIN AND SUBCUTANEOUS TISSUE: Status: ACTIVE | Noted: 2023-02-02

## 2023-02-02 PROBLEM — D48.5 NEOPLASM OF UNCERTAIN BEHAVIOR OF SKIN: Status: ACTIVE | Noted: 2023-02-02

## 2023-02-02 PROBLEM — D22.5 MELANOCYTIC NEVI OF TRUNK: Status: ACTIVE | Noted: 2023-02-02

## 2023-02-02 PROCEDURE — 11102 TANGNTL BX SKIN SINGLE LES: CPT | Mod: 59

## 2023-02-02 PROCEDURE — OTHER COUNSELING: OTHER

## 2023-02-02 PROCEDURE — OTHER BENIGN DESTRUCTION: OTHER

## 2023-02-02 PROCEDURE — OTHER ADDITIONAL NOTES: OTHER

## 2023-02-02 PROCEDURE — 17110 DESTRUCT B9 LESION 1-14: CPT

## 2023-02-02 PROCEDURE — OTHER PRESCRIPTION: OTHER

## 2023-02-02 PROCEDURE — OTHER MIPS QUALITY: OTHER

## 2023-02-02 PROCEDURE — 99203 OFFICE O/P NEW LOW 30 MIN: CPT | Mod: 25

## 2023-02-02 PROCEDURE — OTHER BIOPSY BY SHAVE METHOD: OTHER

## 2023-02-02 RX ORDER — TRIAMCINOLONE ACETONIDE 1 MG/G
CREAM TOPICAL BID
Qty: 80 | Refills: 3 | Status: ERX | COMMUNITY
Start: 2023-02-02

## 2023-02-02 ASSESSMENT — LOCATION SIMPLE DESCRIPTION DERM
LOCATION SIMPLE: SCALP
LOCATION SIMPLE: UPPER BACK
LOCATION SIMPLE: LEFT UPPER BACK
LOCATION SIMPLE: RIGHT FOREARM
LOCATION SIMPLE: LEFT FOREARM
LOCATION SIMPLE: RIGHT GREAT TOE

## 2023-02-02 ASSESSMENT — LOCATION ZONE DERM
LOCATION ZONE: SCALP
LOCATION ZONE: ARM
LOCATION ZONE: TOE
LOCATION ZONE: TRUNK

## 2023-02-02 ASSESSMENT — LOCATION DETAILED DESCRIPTION DERM
LOCATION DETAILED: LEFT SUPERIOR PARIETAL SCALP
LOCATION DETAILED: RIGHT DORSAL GREAT TOE
LOCATION DETAILED: LEFT MID-UPPER BACK
LOCATION DETAILED: LEFT PROXIMAL DORSAL FOREARM
LOCATION DETAILED: RIGHT PROXIMAL DORSAL FOREARM
LOCATION DETAILED: INFERIOR THORACIC SPINE

## 2023-02-02 NOTE — HPI: FULL BODY SKIN EXAMINATION
What Type Of Note Output Would You Prefer (Optional)?: Standard Output
What Is The Reason For Today's Visit?: Full Body Skin Examination
What Is The Reason For Today's Visit? (Being Monitored For X): concerning skin lesions on an annual basis
Additional History: Patient expressed concern regarding a few spots located on his arms and back, they are itchy, enlarging, and irritated.

## 2023-02-02 NOTE — PROCEDURE: ADDITIONAL NOTES
Render Risk Assessment In Note?: no
Detail Level: Simple
Additional Notes: Instructed that if not resolved with topical triamcinolone we will treat as precancerous lesions at follow up appt

## 2023-02-02 NOTE — PROCEDURE: BIOPSY BY SHAVE METHOD
Bill For Surgical Tray: no
Electrodesiccation Text: The wound bed was treated with electrodesiccation after the biopsy was performed.
Hemostasis: Electrocautery
Biopsy Method: Dermablade
Curettage Text: The wound bed was treated with curettage after the biopsy was performed.
Detail Level: Detailed
Anesthesia Type: 1% lidocaine with epinephrine and a 1:10 solution of 8.4% sodium bicarbonate
Consent: Written consent was obtained and risks were reviewed including but not limited to scarring, infection, bleeding, scabbing, incomplete removal, nerve damage and allergy to anesthesia.
Size Of Lesion In Cm: 0
Notification Instructions: Patient will be notified of biopsy results. However, patient instructed to call the office if not contacted within 2 weeks.
Path Notes (To The Dermatopathologist): Please check margins
Was A Bandage Applied: Yes
Billing Type: Third-Party Bill
Electrodesiccation And Curettage Text: The wound bed was treated with electrodesiccation and curettage after the biopsy was performed.
Cryotherapy Text: The wound bed was treated with cryotherapy after the biopsy was performed.
Biopsy Type: H and E
Dressing: no dressing applied
Post-Care Instructions: I reviewed with the patient in detail post-care instructions. Patient is to keep the biopsy site dry overnight, and then apply bacitracin twice daily until healed. Patient may apply hydrogen peroxide soaks to remove any crusting.
Type Of Destruction Used: Curettage
Depth Of Biopsy: dermis
Anesthesia Volume In Cc (Will Not Render If 0): 0.7
Silver Nitrate Text: The wound bed was treated with silver nitrate after the biopsy was performed.
Wound Care: Petrolatum
Information: Selecting Yes will display possible errors in your note based on the variables you have selected. This validation is only offered as a suggestion for you. PLEASE NOTE THAT THE VALIDATION TEXT WILL BE REMOVED WHEN YOU FINALIZE YOUR NOTE. IF YOU WANT TO FAX A PRELIMINARY NOTE YOU WILL NEED TO TOGGLE THIS TO 'NO' IF YOU DO NOT WANT IT IN YOUR FAXED NOTE.

## 2023-02-02 NOTE — PROCEDURE: BENIGN DESTRUCTION
Treatment Number (Will Not Render If 0): 1
Anesthesia Volume In Cc: 0.5
Detail Level: Detailed
Medical Necessity Clause: This procedure was medically necessary for the above listed reasons.
Medical Necessity Information: It is in your best interest to select a reason for this procedure from the list below. All of these items fulfill various CMS LCD requirements except the new and changing color options.
Post-Care Instructions: I reviewed with the patient in detail post-care instructions. Patient is to wear sunprotection, and avoid picking at any of the treated lesions. Pt may apply Vaseline to crusted or scabbing areas.
Include Z78.9 (Other Specified Conditions Influencing Health Status) As An Associated Diagnosis?: No
Consent: The patient's consent was obtained including but not limited to risks of crusting, scabbing, blistering, scarring, darker or lighter pigmentary change, recurrence, incomplete removal and infection.

## 2023-03-09 ENCOUNTER — APPOINTMENT (OUTPATIENT)
Dept: URBAN - METROPOLITAN AREA CLINIC 260 | Age: 48
Setting detail: DERMATOLOGY
End: 2023-03-21

## 2023-03-09 VITALS — WEIGHT: 260 LBS | HEIGHT: 73 IN

## 2023-03-09 DIAGNOSIS — D485 NEOPLASM OF UNCERTAIN BEHAVIOR OF SKIN: ICD-10-CM

## 2023-03-09 DIAGNOSIS — L20.89 OTHER ATOPIC DERMATITIS: ICD-10-CM

## 2023-03-09 PROBLEM — D48.5 NEOPLASM OF UNCERTAIN BEHAVIOR OF SKIN: Status: ACTIVE | Noted: 2023-03-09

## 2023-03-09 PROBLEM — L20.84 INTRINSIC (ALLERGIC) ECZEMA: Status: ACTIVE | Noted: 2023-03-09

## 2023-03-09 PROCEDURE — OTHER COUNSELING: OTHER

## 2023-03-09 PROCEDURE — OTHER BIOPSY BY PUNCH METHOD: OTHER

## 2023-03-09 PROCEDURE — 11104 PUNCH BX SKIN SINGLE LESION: CPT

## 2023-03-09 PROCEDURE — OTHER PHOTO-DOCUMENTATION: OTHER

## 2023-03-09 PROCEDURE — OTHER MIPS QUALITY: OTHER

## 2023-03-09 PROCEDURE — OTHER PRESCRIPTION MEDICATION MANAGEMENT: OTHER

## 2023-03-09 PROCEDURE — 99214 OFFICE O/P EST MOD 30 MIN: CPT | Mod: 25

## 2023-03-09 ASSESSMENT — LOCATION DETAILED DESCRIPTION DERM
LOCATION DETAILED: LEFT DISTAL DORSAL FOREARM
LOCATION DETAILED: LEFT PROXIMAL DORSAL FOREARM
LOCATION DETAILED: RIGHT PROXIMAL DORSAL FOREARM

## 2023-03-09 ASSESSMENT — LOCATION SIMPLE DESCRIPTION DERM
LOCATION SIMPLE: RIGHT FOREARM
LOCATION SIMPLE: LEFT FOREARM

## 2023-03-09 ASSESSMENT — LOCATION ZONE DERM: LOCATION ZONE: ARM

## 2023-03-09 NOTE — PROCEDURE: PHOTO-DOCUMENTATION
Detail Level: Zone
Photo Preface (Leave Blank If You Do Not Want): Photographs were obtained today in [facility] and by patient

## 2023-03-09 NOTE — PROCEDURE: PRESCRIPTION MEDICATION MANAGEMENT
Render In Strict Bullet Format?: No
Continue Regimen: Triamcinolone cream
Detail Level: Zone
Plan: Recheck pending biopsy results

## 2023-03-09 NOTE — PROCEDURE: BIOPSY BY PUNCH METHOD
Home Suture Removal Text: Patient was provided a home suture removal kit and will remove their sutures at home.  If they have any questions or difficulties they will call the office.
Punch Size In Mm: 4
Detail Level: Detailed
Render In Bullet Format When Appropriate: No
Consent: Written consent was obtained and risks were reviewed including but not limited to scarring, infection, bleeding, scabbing, incomplete removal, nerve damage and allergy to anesthesia.
Epidermal Sutures: 4-0 Prolene
Additional Anesthesia Volume In Cc (Will Not Render If 0): 0
Dressing: bandage
Depth Of Punch Biopsy: dermis
Anesthesia Volume In Cc (Will Not Render If 0): 3
Validate Note Data (See Information Below): Yes
Billing Type: Third-Party Bill
Post-Care Instructions: I reviewed with the patient in detail post-care instructions. Patient is to keep the biopsy site dry overnight, and then apply bacitracin twice daily until healed. Patient may apply hydrogen peroxide soaks to remove any crusting.
Hemostasis: None
Wound Care: Petrolatum
Biopsy Type: H and E
Suture Removal: 14 days
Notification Instructions: Patient will be notified of biopsy results. However, patient instructed to call the office if not contacted within 2 weeks.
Anesthesia Type: 1% lidocaine with epinephrine
Information: Selecting Yes will display possible errors in your note based on the variables you have selected. This validation is only offered as a suggestion for you. PLEASE NOTE THAT THE VALIDATION TEXT WILL BE REMOVED WHEN YOU FINALIZE YOUR NOTE. IF YOU WANT TO FAX A PRELIMINARY NOTE YOU WILL NEED TO TOGGLE THIS TO 'NO' IF YOU DO NOT WANT IT IN YOUR FAXED NOTE.

## 2023-03-23 ENCOUNTER — APPOINTMENT (OUTPATIENT)
Dept: URBAN - METROPOLITAN AREA CLINIC 260 | Age: 48
Setting detail: DERMATOLOGY
End: 2023-03-30

## 2023-03-23 DIAGNOSIS — Z48.02 ENCOUNTER FOR REMOVAL OF SUTURES: ICD-10-CM

## 2023-03-23 PROCEDURE — OTHER COUNSELING: OTHER

## 2023-03-23 PROCEDURE — OTHER PHOTO-DOCUMENTATION: OTHER

## 2023-03-23 PROCEDURE — OTHER SUTURE REMOVAL (GLOBAL PERIOD): OTHER

## 2023-03-23 PROCEDURE — OTHER MIPS QUALITY: OTHER

## 2023-03-23 ASSESSMENT — LOCATION SIMPLE DESCRIPTION DERM: LOCATION SIMPLE: LEFT FOREARM

## 2023-03-23 ASSESSMENT — LOCATION ZONE DERM: LOCATION ZONE: ARM

## 2023-03-23 ASSESSMENT — LOCATION DETAILED DESCRIPTION DERM: LOCATION DETAILED: LEFT DISTAL DORSAL FOREARM

## 2023-03-23 NOTE — PROCEDURE: SUTURE REMOVAL (GLOBAL PERIOD)
Detail Level: Detailed
Add 34515 Cpt? (Important Note: In 2017 The Use Of 64284 Is Being Tracked By Cms To Determine Future Global Period Reimbursement For Global Periods): no

## 2023-04-16 ENCOUNTER — HEALTH MAINTENANCE LETTER (OUTPATIENT)
Age: 48
End: 2023-04-16

## 2023-05-04 ENCOUNTER — APPOINTMENT (OUTPATIENT)
Dept: URBAN - METROPOLITAN AREA CLINIC 260 | Age: 48
Setting detail: DERMATOLOGY
End: 2023-05-07

## 2023-05-04 VITALS — HEIGHT: 73 IN | WEIGHT: 260 LBS

## 2023-05-04 DIAGNOSIS — L57.0 ACTINIC KERATOSIS: ICD-10-CM

## 2023-05-04 PROCEDURE — OTHER LIQUID NITROGEN: OTHER

## 2023-05-04 PROCEDURE — OTHER PRESCRIPTION: OTHER

## 2023-05-04 PROCEDURE — OTHER ADDITIONAL NOTES: OTHER

## 2023-05-04 PROCEDURE — 99214 OFFICE O/P EST MOD 30 MIN: CPT | Mod: 25

## 2023-05-04 PROCEDURE — 17004 DESTROY PREMAL LESIONS 15/>: CPT

## 2023-05-04 PROCEDURE — OTHER MIPS QUALITY: OTHER

## 2023-05-04 PROCEDURE — OTHER COUNSELING: OTHER

## 2023-05-04 PROCEDURE — OTHER SEPARATE AND IDENTIFIABLE DOCUMENTATION: OTHER

## 2023-05-04 RX ORDER — FLUOROURACIL 5 MG/G
5% CREAM TOPICAL
Qty: 40 | Refills: 1 | Status: ERX | COMMUNITY
Start: 2023-05-04

## 2023-05-04 ASSESSMENT — LOCATION DETAILED DESCRIPTION DERM
LOCATION DETAILED: RIGHT DISTAL DORSAL FOREARM
LOCATION DETAILED: LEFT DISTAL DORSAL FOREARM
LOCATION DETAILED: RIGHT DISTAL ULNAR DORSAL FOREARM
LOCATION DETAILED: LEFT PROXIMAL RADIAL DORSAL FOREARM
LOCATION DETAILED: LEFT PROXIMAL DORSAL FOREARM
LOCATION DETAILED: RIGHT PROXIMAL DORSAL FOREARM
LOCATION DETAILED: LEFT DISTAL RADIAL DORSAL FOREARM
LOCATION DETAILED: RIGHT DISTAL RADIAL DORSAL FOREARM

## 2023-05-04 ASSESSMENT — LOCATION SIMPLE DESCRIPTION DERM
LOCATION SIMPLE: RIGHT FOREARM
LOCATION SIMPLE: LEFT FOREARM

## 2023-05-04 ASSESSMENT — LOCATION ZONE DERM: LOCATION ZONE: ARM

## 2023-05-04 NOTE — PROCEDURE: ADDITIONAL NOTES
Detail Level: Simple
Render Risk Assessment In Note?: no
Additional Notes: Patient will apply Fluorouracil cream once daily to forearms as needed x7 days.

## 2023-05-04 NOTE — PROCEDURE: LIQUID NITROGEN
Render Note In Bullet Format When Appropriate: Yes
Application Tool (Optional): Liquid Nitrogen Sprayer
Consent: - Discussed that these are a result of cumulative sun exposure.\\n- Verbal and written consent was obtained, and risks were reviewed prior to procedure today. \\n- Risks discussed include but are not limited to pain, crusting, scabbing, blistering, scarring, temporary or permanent darker or lighter pigmentary change, recurrence, incomplete resolution, and infection.
Detail Level: Detailed
Post-Care Instructions: - Patient was instructed to avoid picking at any of the treated lesions.

## 2023-05-04 NOTE — PROCEDURE: COUNSELING
Patient Specific Counseling (Will Not Stick From Patient To Patient): Recommend using 5FU cream to forearms once daily as needed when noticed new active AK for 7 days.
Detail Level: Detailed

## 2023-05-18 ENCOUNTER — TELEPHONE (OUTPATIENT)
Dept: OPHTHALMOLOGY | Facility: CLINIC | Age: 48
End: 2023-05-18
Payer: COMMERCIAL

## 2023-09-20 DIAGNOSIS — H30.042 ACUTE MACULAR NEURORETINITIS OF LEFT EYE: Primary | ICD-10-CM

## 2023-09-28 ENCOUNTER — OFFICE VISIT (OUTPATIENT)
Dept: OPHTHALMOLOGY | Facility: CLINIC | Age: 48
End: 2023-09-28
Attending: OPHTHALMOLOGY
Payer: COMMERCIAL

## 2023-09-28 DIAGNOSIS — H30.042 ACUTE MACULAR NEURORETINITIS OF LEFT EYE: ICD-10-CM

## 2023-09-28 PROCEDURE — 92250 FUNDUS PHOTOGRAPHY W/I&R: CPT | Performed by: OPHTHALMOLOGY

## 2023-09-28 PROCEDURE — 99214 OFFICE O/P EST MOD 30 MIN: CPT | Mod: GC | Performed by: OPHTHALMOLOGY

## 2023-09-28 PROCEDURE — 92015 DETERMINE REFRACTIVE STATE: CPT

## 2023-09-28 PROCEDURE — G0463 HOSPITAL OUTPT CLINIC VISIT: HCPCS | Performed by: OPHTHALMOLOGY

## 2023-09-28 PROCEDURE — 99207 FUNDUS AUTOFLUORESCENCE IMAGE (FAF) OU (BOTH EYES): CPT | Mod: 26 | Performed by: OPHTHALMOLOGY

## 2023-09-28 ASSESSMENT — CONF VISUAL FIELD
OD_SUPERIOR_NASAL_RESTRICTION: 0
OD_SUPERIOR_TEMPORAL_RESTRICTION: 0
OS_SUPERIOR_NASAL_RESTRICTION: 0
OS_INFERIOR_TEMPORAL_RESTRICTION: 0
OD_INFERIOR_TEMPORAL_RESTRICTION: 0
METHOD: COUNTING FINGERS
OS_NORMAL: 1
OS_INFERIOR_NASAL_RESTRICTION: 0
OD_INFERIOR_NASAL_RESTRICTION: 0
OS_SUPERIOR_TEMPORAL_RESTRICTION: 0
OD_NORMAL: 1

## 2023-09-28 ASSESSMENT — REFRACTION_MANIFEST
OS_AXIS: 180
OD_CYLINDER: +1.00
OD_AXIS: 040
OS_SPHERE: -2.00
OS_ADD: +1.25
OS_CYLINDER: +0.25
OD_SPHERE: -1.75
OD_ADD: +1.25

## 2023-09-28 ASSESSMENT — VISUAL ACUITY
OD_CC: 20/15
CORRECTION_TYPE: GLASSES
OS_CC: 20/15
METHOD: SNELLEN - LINEAR

## 2023-09-28 ASSESSMENT — REFRACTION_WEARINGRX
OS_CYLINDER: +0.25
OD_CYLINDER: +0.75
OS_SPHERE: -1.75
OD_SPHERE: -1.75
OS_AXIS: 015
OD_AXIS: 032

## 2023-09-28 ASSESSMENT — TONOMETRY
OD_IOP_MMHG: 20
OS_IOP_MMHG: 18
IOP_METHOD: TONOPEN

## 2023-09-28 ASSESSMENT — EXTERNAL EXAM - LEFT EYE: OS_EXAM: NORMAL

## 2023-09-28 ASSESSMENT — SLIT LAMP EXAM - LIDS
COMMENTS: NORMAL
COMMENTS: NORMAL

## 2023-09-28 ASSESSMENT — EXTERNAL EXAM - RIGHT EYE: OD_EXAM: NORMAL

## 2023-09-28 ASSESSMENT — CUP TO DISC RATIO
OS_RATIO: 0.3
OD_RATIO: 0.3

## 2023-09-28 NOTE — PROGRESS NOTES
I have confirmed the patient's and reviewed Past Medical History, Past Surgical History, Social History, Family History, Problem List, Medication List and agree with Tech note.    CC: follow up for [facility]    HPI:  - vision seems stable with no new ocular symptoms.  - sees an optometrist every year    SUMMARY: Mr. Eid is a 47 year old male who was referred by Dr. Harmon for further evaluation for concerns in the patient's OCT for an abnormality inferotemporally. 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     IMAGING/TESTING:  - FAF:  Right eye: No hyper or hypo FAF  Left eye: Superior hypoFAF, stable     A/P:    Assessment & Plan     1. Unilateral Acute Idiopathic Maculopathy, left eye:   - appears stable and consistent with previous exam   - 55 degree fundus autofluorescence unchanged OU (previous inferotemporal lesion OS completely resolved)   - continue to monitor    2. Myelinated retina nerve fiber layer, left eye:   - Compared to Optos fundus photos from last year, seems stable to slightly smaller in appearance on exam    3. Myopic astigmatism, both eyes: stable     4. 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 Ronn Etienne and agree with the findings in this note, as well as the interpretations of the diagnostic tests.    Alycia Ramírez MD PhD.  Professor & Chair

## 2023-09-28 NOTE — NURSING NOTE
Chief Complaints and History of Present Illnesses   Patient presents with    Follow Up     1 year follow up Acute macular neuroretinitis of left eye     Chief Complaint(s) and History of Present Illness(es)       Follow Up              Comments: 1 year follow up Acute macular neuroretinitis of left eye              Comments    Pt states vision has been stable since last visit. Occasional difficulty reading labels up close. No new eye pain. No new flashes or floaters.   No redness or dryness.  Pt prediabetic, takes metformin. Does not check BS.  No results found for: A1C    PATRICK Olson September 28, 2023 8:07 AM

## 2024-06-23 ENCOUNTER — HEALTH MAINTENANCE LETTER (OUTPATIENT)
Age: 49
End: 2024-06-23

## 2024-09-18 DIAGNOSIS — H30.042 ACUTE MACULAR NEURORETINITIS OF LEFT EYE: Primary | ICD-10-CM

## 2024-10-03 ENCOUNTER — OFFICE VISIT (OUTPATIENT)
Dept: OPHTHALMOLOGY | Facility: CLINIC | Age: 49
End: 2024-10-03
Attending: OPHTHALMOLOGY
Payer: COMMERCIAL

## 2024-10-03 DIAGNOSIS — H30.042 ACUTE MACULAR NEURORETINITIS OF LEFT EYE: ICD-10-CM

## 2024-10-03 PROCEDURE — 99214 OFFICE O/P EST MOD 30 MIN: CPT

## 2024-10-03 PROCEDURE — 99207 FUNDUS AUTOFLUORESCENCE IMAGE (FAF) OU (BOTH EYES): CPT | Mod: 26

## 2024-10-03 PROCEDURE — 92134 CPTRZ OPH DX IMG PST SGM RTA: CPT

## 2024-10-03 PROCEDURE — 92250 FUNDUS PHOTOGRAPHY W/I&R: CPT

## 2024-10-03 ASSESSMENT — REFRACTION_WEARINGRX
OD_AXIS: 040
OD_CYLINDER: +1.00
OS_AXIS: 180
OS_CYLINDER: +0.25
OD_SPHERE: -1.75
OS_SPHERE: -2.00
OS_ADD: +1.25
SPECS_TYPE: PAL
OD_ADD: +1.25

## 2024-10-03 ASSESSMENT — SLIT LAMP EXAM - LIDS
COMMENTS: NORMAL
COMMENTS: NORMAL

## 2024-10-03 ASSESSMENT — VISUAL ACUITY
CORRECTION_TYPE: GLASSES
OD_CC: 20/20
METHOD: SNELLEN - LINEAR
OS_CC: 20/20

## 2024-10-03 ASSESSMENT — TONOMETRY
OD_IOP_MMHG: 16
OS_IOP_MMHG: 14
IOP_METHOD: TONOPEN

## 2024-10-03 ASSESSMENT — CONF VISUAL FIELD
OD_NORMAL: 1
OS_INFERIOR_TEMPORAL_RESTRICTION: 0
OS_SUPERIOR_TEMPORAL_RESTRICTION: 0
OD_SUPERIOR_TEMPORAL_RESTRICTION: 0
OD_INFERIOR_NASAL_RESTRICTION: 0
OS_NORMAL: 1
OS_INFERIOR_NASAL_RESTRICTION: 0
METHOD: COUNTING FINGERS
OD_SUPERIOR_NASAL_RESTRICTION: 0
OD_INFERIOR_TEMPORAL_RESTRICTION: 0
OS_SUPERIOR_NASAL_RESTRICTION: 0

## 2024-10-03 ASSESSMENT — EXTERNAL EXAM - LEFT EYE: OS_EXAM: NORMAL

## 2024-10-03 ASSESSMENT — CUP TO DISC RATIO
OS_RATIO: 0.3
OD_RATIO: 0.3

## 2024-10-03 ASSESSMENT — EXTERNAL EXAM - RIGHT EYE: OD_EXAM: NORMAL

## 2024-10-03 NOTE — PROGRESS NOTES
CC: [facility]    HPI: Ms Eid is coming in for follow up. 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  I personally obtained the chief complaint(s) and history of present illness.  I confirmed and edited as necessary the review of systems, past medical/surgical history, family history, social history, and examination findings as documented by others; and I examined the patient myself.  I personally reviewed the relevant tests, images, and reports as documented above.  I formulated and edited as necessary the assessment and plan and discussed the findings and management plan with the patient and family. Leigh Reyes MD

## 2024-10-03 NOTE — NURSING NOTE
Chief Complaints and History of Present Illnesses   Patient presents with    Follow Up     Acute macular neuroretinitis of left eye     Chief Complaint(s) and History of Present Illness(es)       Follow Up              Comments: Acute macular neuroretinitis of left eye              Comments    Pt states no change in [place] since last visit  No flashes or floaters   No eye pain or redness    Janey CASTILLO 7:05 AM October 3, 2024

## 2025-07-12 ENCOUNTER — HEALTH MAINTENANCE LETTER (OUTPATIENT)
Age: 50
End: 2025-07-12